# Patient Record
Sex: MALE | Race: ASIAN | NOT HISPANIC OR LATINO
[De-identification: names, ages, dates, MRNs, and addresses within clinical notes are randomized per-mention and may not be internally consistent; named-entity substitution may affect disease eponyms.]

---

## 2020-11-02 PROBLEM — Z00.00 ENCOUNTER FOR PREVENTIVE HEALTH EXAMINATION: Status: ACTIVE | Noted: 2020-11-02

## 2020-11-04 ENCOUNTER — APPOINTMENT (OUTPATIENT)
Dept: ORTHOPEDIC SURGERY | Facility: CLINIC | Age: 80
End: 2020-11-04
Payer: MEDICARE

## 2020-11-04 VITALS
DIASTOLIC BLOOD PRESSURE: 70 MMHG | SYSTOLIC BLOOD PRESSURE: 110 MMHG | HEIGHT: 67 IN | HEART RATE: 83 BPM | BODY MASS INDEX: 20.4 KG/M2 | TEMPERATURE: 97.9 F | WEIGHT: 130 LBS | OXYGEN SATURATION: 97 %

## 2020-11-04 DIAGNOSIS — G89.29 LOW BACK PAIN: ICD-10-CM

## 2020-11-04 DIAGNOSIS — K80.20 CALCULUS OF GALLBLADDER W/OUT CHOLECYSTITIS W/OUT OBSTRUCTION: ICD-10-CM

## 2020-11-04 DIAGNOSIS — Z72.3 LACK OF PHYSICAL EXERCISE: ICD-10-CM

## 2020-11-04 DIAGNOSIS — M54.5 LOW BACK PAIN: ICD-10-CM

## 2020-11-04 DIAGNOSIS — Z87.438 PERSONAL HISTORY OF OTHER DISEASES OF MALE GENITAL ORGANS: ICD-10-CM

## 2020-11-04 PROCEDURE — 99072 ADDL SUPL MATRL&STAF TM PHE: CPT

## 2020-11-04 PROCEDURE — 99204 OFFICE O/P NEW MOD 45 MIN: CPT

## 2020-11-04 RX ORDER — UBIDECARENONE/VIT E ACET 100MG-5
CAPSULE ORAL
Refills: 0 | Status: ACTIVE | COMMUNITY

## 2020-11-04 RX ORDER — ATORVASTATIN CALCIUM 80 MG/1
TABLET, FILM COATED ORAL
Refills: 0 | Status: ACTIVE | COMMUNITY

## 2020-11-04 RX ORDER — TAMSULOSIN HCL 0.4 MG
CAPSULE ORAL
Refills: 0 | Status: ACTIVE | COMMUNITY

## 2020-11-04 RX ORDER — MULTIVIT-MIN/IRON/FOLIC ACID/K 18-600-40
CAPSULE ORAL
Refills: 0 | Status: ACTIVE | COMMUNITY

## 2020-11-04 RX ORDER — ISOPROPYL ALCOHOL 700 MG/ML
LIQUID TOPICAL
Refills: 0 | Status: ACTIVE | COMMUNITY

## 2020-11-04 RX ORDER — FINASTERIDE 5 MG/1
TABLET, FILM COATED ORAL
Refills: 0 | Status: ACTIVE | COMMUNITY

## 2020-11-24 ENCOUNTER — APPOINTMENT (OUTPATIENT)
Dept: ORTHOPEDIC SURGERY | Facility: CLINIC | Age: 80
End: 2020-11-24
Payer: MEDICARE

## 2020-11-24 VITALS — TEMPERATURE: 97.1 F

## 2020-11-24 PROCEDURE — 99072 ADDL SUPL MATRL&STAF TM PHE: CPT

## 2020-11-24 PROCEDURE — 99204 OFFICE O/P NEW MOD 45 MIN: CPT

## 2020-12-01 ENCOUNTER — APPOINTMENT (OUTPATIENT)
Dept: ENDOCRINOLOGY | Facility: CLINIC | Age: 80
End: 2020-12-01
Payer: MEDICARE

## 2020-12-01 VITALS
HEART RATE: 99 BPM | WEIGHT: 132 LBS | SYSTOLIC BLOOD PRESSURE: 109 MMHG | BODY MASS INDEX: 20.67 KG/M2 | DIASTOLIC BLOOD PRESSURE: 72 MMHG

## 2020-12-01 PROCEDURE — 99205 OFFICE O/P NEW HI 60 MIN: CPT

## 2020-12-01 PROCEDURE — 99072 ADDL SUPL MATRL&STAF TM PHE: CPT

## 2020-12-02 LAB
25(OH)D3 SERPL-MCNC: 51.6 NG/ML
ALBUMIN SERPL ELPH-MCNC: 4.7 G/DL
ALP BLD-CCNC: 83 U/L
ALT SERPL-CCNC: 14 U/L
ANION GAP SERPL CALC-SCNC: 13 MMOL/L
AST SERPL-CCNC: 22 U/L
BILIRUB SERPL-MCNC: 0.3 MG/DL
BUN SERPL-MCNC: 26 MG/DL
CALCIUM SERPL-MCNC: 10.3 MG/DL
CALCIUM SERPL-MCNC: 10.3 MG/DL
CHLORIDE SERPL-SCNC: 104 MMOL/L
CO2 SERPL-SCNC: 24 MMOL/L
CREAT SERPL-MCNC: 0.99 MG/DL
GLUCOSE SERPL-MCNC: 110 MG/DL
MAGNESIUM SERPL-MCNC: 2.1 MG/DL
PARATHYROID HORMONE INTACT: 31 PG/ML
PHOSPHATE SERPL-MCNC: 3.7 MG/DL
POTASSIUM SERPL-SCNC: 4.1 MMOL/L
PROT SERPL-MCNC: 7.1 G/DL
SODIUM SERPL-SCNC: 142 MMOL/L

## 2020-12-04 LAB
ALBUMIN MFR SERPL ELPH: 59.3 %
ALBUMIN SERPL-MCNC: 4.2 G/DL
ALBUMIN/GLOB SERPL: 1.4 RATIO
ALPHA1 GLOB MFR SERPL ELPH: 3.8 %
ALPHA1 GLOB SERPL ELPH-MCNC: 0.3 G/DL
ALPHA2 GLOB MFR SERPL ELPH: 8.9 %
ALPHA2 GLOB SERPL ELPH-MCNC: 0.6 G/DL
B-GLOBULIN MFR SERPL ELPH: 12 %
B-GLOBULIN SERPL ELPH-MCNC: 0.9 G/DL
GAMMA GLOB FLD ELPH-MCNC: 1.1 G/DL
GAMMA GLOB MFR SERPL ELPH: 16 %
INTERPRETATION SERPL IEP-IMP: NORMAL
PROT SERPL-MCNC: 7.1 G/DL
PROT SERPL-MCNC: 7.1 G/DL

## 2020-12-07 LAB
ALBUPE: 27.1 %
ALP BONE SERPL-MCNC: 10.9 UG/L
ALPHA1UPE: 29.8 %
ALPHA2UPE: 18.3 %
BETAUPE: 12.7 %
GAMMAUPE: 12.1 %
IGA 24H UR QL IFE: NORMAL
KAPPA LC 24H UR QL: NORMAL
PROT PATTERN 24H UR ELPH-IMP: NORMAL
PROT UR-MCNC: 33 MG/DL
PROT UR-MCNC: 33 MG/DL

## 2021-01-04 NOTE — PHYSICAL EXAM
[de-identified] : Physical Exam:\par \par General: patient is well developed, well nourished, in no acute \par distress, alert and oriented x 3. \par \par Mood and affect: normal\par \par Respiratory: no respiratory distress noted\par \par Skin: no scars over spine, skin intact, no erythema, increased warmth\par \par Alignment:The spine is well compensated in the coronal and sagittal plane. There is no asymmetry on Manriquez Forward Bend Test.\par \par Gait: The patient is able to toe walk and heel walk without difficulty. The patient is able to tandem walk without difficulty.\par \par Palpation: no tenderness to palpation midline along spine or paraspinal region\par \par Range of motion: Cervical and Lumbar spine ROM is full\par \par Neurologic Exam:\par Motor: Manual Muscle testing in the upper and lower extremities is 5 out of 5 in all muscle groups. There is no evidence of muscular atrophy in the upper extremities. Sensory: Sensation to light touch is grossly intact in the upper and lower extremities\par \par Reflexes: DTR are present and symmetric throughout, negative lombardo bilaterally, negative inverted radial reflex bilaterally, no clonus, plantar responses are flexor\par \par Special tests: Spurlings sign absent. Lhermitte's sign is absent. Straight Leg Raise Negative, Cross Straight Leg Raise Negative, VICKI Test Negative\par \par Hip Exam: Full painless ROM of bilateral hips\par \par Vascular: Examination of the peripheral vascular system demonstrates no evidence of congestion or edema. no lymphedema bilateral lower extremities, pulses are present and symmetric in both lower extremities.\par \par \par  [de-identified] : DEXA 11/2020: severe osteoporosis\par \par XR full spine 11/2020 ([facility]): moderate/severe anterior wedge deformity of T10 vertebral body with focal exaggerated kyphosis; possible mild anterior wedge deformities of T11, L1 and L2 vertebral bodies; stable from prior imaging; mild thoracolumbar scoliosis\par \par MRI lumbar 11/2020 ([facility]): L4/L5 and L5/S1 disc bulges; T11/T12 disc bulge effacing ventral cord\par \par MRI thoracic 8/2020 ([facility]): bioconcave T10 vertebral body compression fracture, no retropulsion

## 2021-01-04 NOTE — REASON FOR VISIT
[Follow-Up Visit] : a follow-up visit for [Back Pain] : back pain [Other: ____] : [unfilled] [Family Member] : family member [Patient Declined  Services] : - None: Patient declined  services [FreeTextEntry3] : Patient's daughter present for exam and translating for patient

## 2021-01-04 NOTE — DISCUSSION/SUMMARY
[de-identified] : Discussed my findings with patient and daughter (who is translating for patient). DEXA scan shows severe osteoporosis. Discussed the need treatment to reduce risk of additional fractures, referral to endocrinology given. Recommending a TLSO brace due to severe osteoporosis, order given. No treatment indicated for fractures at this time. The patient will follow up with me in 2-3 months, sooner if there is an issue. XR full spine at that time. All questions answered.

## 2021-01-04 NOTE — HISTORY OF PRESENT ILLNESS
[de-identified] : Follow up 11/24/20: Patient presents for follow up. Continues to have back pain, unchanged. Denies new neurologic symptoms. Here to review imaging. \par \par Initial 11/4/20: Mr. MCCORMACK is a very pleasant 80 year old male who complains of mid and low back pain, onset 1/2020 after a fall. On initial presentation symptoms were as follows: Patient described the pain as intermittent.  Patient rated the pain as moderate.  The pain localized to mid and lower back.  There is no radiation of pain.  Patient  denies extremity numbness and paresthesias. \par \par The patient reports no loss of hand dexterity.\par The patient states there no loss of balance when walking.\par There no sensory loss in the arms or legs\par The patent no difficulty with urination.\par \par The patient no history of previous spine surgery.\par \par The patient has no history of unexpected weight loss, no history of active cancer, no history bladder or bowel dysfunction, no night pain, no fevers or chills.\par \par The past medical history, surgical history, family history, allergies, medications, 10+ point review of systems, family history and social history were reviewed and non contributory.\par \par

## 2021-02-16 NOTE — REASON FOR VISIT
[Initial Consultation] : an initial consultation for [Back Pain] : back pain [Family Member] : family member [Patient Declined  Services] : - None: Patient declined  services [FreeTextEntry3] : Patient's daughter present for exam and translating for patient.

## 2021-02-16 NOTE — PHYSICAL EXAM
[de-identified] : Physical Exam:\par \par General: patient is well developed, well nourished, in no acute \par distress, alert and oriented x 3. \par \par Mood and affect: normal\par \par Respiratory: no respiratory distress noted\par \par Skin: no scars over spine, skin intact, no erythema, increased warmth\par \par Alignment:The spine is well compensated in the coronal and sagittal plane. There is no asymmetry on Manriquez Forward Bend Test.\par \par Gait: The patient is able to toe walk and heel walk without difficulty. The patient is able to tandem walk without difficulty.\par \par Palpation: no tenderness to palpation midline along spine or paraspinal region\par \par Range of motion: Cervical and Lumbar spine ROM is full\par \par Neurologic Exam:\par Motor: Manual Muscle testing in the upper and lower extremities is 5 out of 5 in all muscle groups. There is no evidence of muscular atrophy in the upper extremities. Sensory: Sensation to light touch is grossly intact in the upper and lower extremities\par \par Reflexes: DTR are present and symmetric throughout, negative lombardo bilaterally, negative inverted radial reflex bilaterally, no clonus, plantar responses are flexor\par \par Special tests: Spurlings sign absent. Lhermitte's sign is absent. Straight Leg Raise Negative, Cross Straight Leg Raise Negative, VICKI Test Negative\par \par Hip Exam: Full painless ROM of bilateral hips\par \par Vascular: Examination of the peripheral vascular system demonstrates no evidence of congestion or edema. no lymphedema bilateral lower extremities, pulses are present and symmetric in both lower extremities.\par \par  [de-identified] : MRI thoracic 8/2020 ([facility]): bioconcave T10 vertebral body compression fracture, no retropulsion

## 2021-02-16 NOTE — DISCUSSION/SUMMARY
[de-identified] : Discussed the results of the patient's history, physical exam, and imaging with patient and daughter (daughter translating for patient).  I am recommending updated XR full spine, MRI lumbar without contrast, orders given. Will also order DEXA scan. The patient will follow up with me after imaging is completed, sooner if there is an issue.  All questions were answered.\par \par

## 2021-02-16 NOTE — HISTORY OF PRESENT ILLNESS
[de-identified] : Mr. MCCORMACK is a very pleasant 80 year old male who complains of mid and low back pain, onset 1/2020 after a fall. On initial presentation symptoms were as follows: Patient described the pain as intermittent. Patient rated the pain as moderate. The pain localized to mid and lower back. There is no radiation of pain. Patient denies extremity numbness and paresthesias. \par \par The patient reports no loss of hand dexterity.\par The patient states there no loss of balance when walking.\par There no sensory loss in the arms or legs\par The patent no difficulty with urination.\par \par The patient no history of previous spine surgery.\par \par The patient has no history of unexpected weight loss, no history of active cancer, no history bladder or bowel dysfunction, no night pain, no fevers or chills.\par \par The past medical history, surgical history, family history, allergies, medications, 10+ point review of systems, family history and social history were reviewed and non contributory.\par \par

## 2021-02-17 LAB
ALBUMIN SERPL ELPH-MCNC: 4.4 G/DL
CALCIUM SERPL-MCNC: 10 MG/DL

## 2021-02-17 NOTE — DATA REVIEWED
[FreeTextEntry1] : Laboratories (June 24, 2020) reviewed and significant for: \par Unremarkable complete blood count\par Calcium 9.3 mg/dL (albumin 4.5 g/dL)\par BUN/creatinine 21/1.08 mg/dL (eGFR 65 mL/min)\par Alkaline phosphatase 81 U/L\par TSH 2.100 uIU/mL\par \par Most recent bone mineral density\par Date: November 24, 2020\par Source: [place]\par Site: [facility] [facility]\par \par Site	BMD (g/cm2)	T-score	Change previous	Change baseline	\par Lumbar spine	0.611	-5.1\par Femoral neck	0.646	-3.3	\par Total hip	                0.735       -2.5         \par Distal radius           	0.579       -2.7          \par DXA Comments:

## 2021-02-17 NOTE — ADDENDUM
[FreeTextEntry1] : Recent laboratory results as below, overall unremarkable for a secondary cause of bone loss; discussed with Kathy per his preference. Random urine protein electrophoresis with borderline elevated protein but no monoclonal proteins. Blood urea nitrogen elevated and recommended hydration. Calciotropic panel otherwise unremarkable. Glucose not fasting. Her father has not made a clear decision regarding teriparatide versus denosumab, however, they want to pursue prior authorization for teriparatide to determine cost. 12/07/20\par \par Teriparatide has been approved; discussed with Kathy. They are interested in teriparatide therapy. Her  can administered subcutaneous injections. We discussed measuring serum calcium one month after starting treatment. 12/08/20\par \par Serum calcium within range on teriparatide; discussed with Kathy. We will repeat blood tests before his next appointment. 2/17/21

## 2021-02-17 NOTE — PHYSICAL EXAM
[Alert] : alert [Healthy Appearance] : healthy appearance [No Acute Distress] : no acute distress [Normal Sclera/Conjunctiva] : normal sclera/conjunctiva [No Neck Mass] : no neck mass was observed [No LAD] : no lymphadenopathy [Supple] : the neck was supple [Thyroid Not Enlarged] : the thyroid was not enlarged [No Respiratory Distress] : no respiratory distress [Clear to Auscultation] : lungs were clear to auscultation bilaterally [Normal S1, S2] : normal S1 and S2 [Normal Rate] : heart rate was normal [Regular Rhythm] : with a regular rhythm [No Spinal Tenderness] : no spinal tenderness [Kyphosis] : kyphosis present [No Stigmata of Cushings Syndrome] : no stigmata of Cushings Syndrome [Normal Gait] : normal gait [Normal Insight/Judgement] : insight and judgment were intact [Scoliosis] : no scoliosis [Acanthosis Nigricans] : no acanthosis nigricans [de-identified] : no moon facies, no supraclavicular fat pads [de-identified] : + difficulty balancing on one leg bilaterally

## 2021-02-17 NOTE — HISTORY OF PRESENT ILLNESS
[FreeTextEntry1] : Mr. Echols is an 80 year-old man with a history of benign prostatic hyperplasia and tobacco use until February 2020 presenting to establish care with me for osteoporosis. He was accompanied by his daughter, Kathy, who acted as an . His son-in-law is a physician involved in his care. \par \par Bone History\par Osteoporosis diagnosed in February 2020 by low trauma fracture; most recent bone density as below\par Fracture history: T10 compression fracture in February 2020 in a fall at home; right-sided rib fractures over 20 years ago\par Family history: Mother with history of kyphosis but no formal diagnosis of osteoporosis\par Treatment: None\par \par Falls: No\par Height loss: About 2 inches\par Kidney stones: No\par Dental health: Regular appointments, history of implants, no upcoming procedures planned\par Exercise: Limited since fracture\par Dairy intake: 0-1 serving daily (glass of milk a few times a week)\par Calcium supplements: None\par Multivitamin: None\par Vitamin D supplements: 1000 intl units daily\par \par Osteoporosis risk factors include: Postmenopausal status,  race, prior fracture, falls, height loss, small thin bones, tobacco use, excessive alcohol, anorexia, family history, vitamin D deficiency, corticosteroid use, seizure medications, malabsorption, hyperparathyroidism, hyperthyroidism.\par NEGATIVE EXCEPT: Prior fracture, tobacco use

## 2021-04-13 ENCOUNTER — APPOINTMENT (OUTPATIENT)
Dept: ORTHOPEDIC SURGERY | Facility: CLINIC | Age: 81
End: 2021-04-13
Payer: MEDICARE

## 2021-04-13 ENCOUNTER — OUTPATIENT (OUTPATIENT)
Dept: OUTPATIENT SERVICES | Facility: HOSPITAL | Age: 81
LOS: 1 days | End: 2021-04-13
Payer: MEDICARE

## 2021-04-13 ENCOUNTER — RESULT REVIEW (OUTPATIENT)
Age: 81
End: 2021-04-13

## 2021-04-13 VITALS — TEMPERATURE: 97.9 F

## 2021-04-13 PROCEDURE — 99214 OFFICE O/P EST MOD 30 MIN: CPT

## 2021-04-13 PROCEDURE — 99072 ADDL SUPL MATRL&STAF TM PHE: CPT

## 2021-04-13 PROCEDURE — 72082 X-RAY EXAM ENTIRE SPI 2/3 VW: CPT

## 2021-04-13 PROCEDURE — 72082 X-RAY EXAM ENTIRE SPI 2/3 VW: CPT | Mod: 26

## 2021-04-13 RX ORDER — VITAMIN A 10000 UNIT
TABLET ORAL
Refills: 0 | Status: DISCONTINUED | COMMUNITY
End: 2021-04-13

## 2021-04-14 NOTE — HISTORY OF PRESENT ILLNESS
[de-identified] : Follow up 4/13/21: Patient presents for follow up. Reports worsening mid and upper thoracic pain over the past several months. Was able to walk about 20 minutes before stopping, now only able to walk 5-10 minutes. Under the care of Dr. Sands for his osteoporosis, has been on forteo for 3 months. Denies new neurologic symptoms.\par \par Follow up 11/24/20: Patient presents for follow up. Continues to have back pain, unchanged. Denies new neurologic symptoms. Here to review imaging. \par \par Initial 11/4/20: Mr. MCCORMACK is a very pleasant 80 year old male who complains of mid and low back pain, onset 1/2020 after a fall. On initial presentation symptoms were as follows: Patient described the pain as intermittent. Patient rated the pain as moderate. The pain localized to mid and lower back. There is no radiation of pain. Patient denies extremity numbness and paresthesias. \par \par The patient reports no loss of hand dexterity.\par The patient states there no loss of balance when walking.\par There no sensory loss in the arms or legs\par The patent no difficulty with urination.\par \par The patient no history of previous spine surgery.\par \par The patient has no history of unexpected weight loss, no history of active cancer, no history bladder or bowel dysfunction, no night pain, no fevers or chills.\par \par The past medical history, surgical history, family history, allergies, medications, 10+ point review of systems, family history and social history were reviewed and non contributory.\par

## 2021-04-14 NOTE — PHYSICAL EXAM
[de-identified] : Physical Exam:\par \par General: patient is well developed, well nourished, in no acute \par distress, alert and oriented x 3. \par \par Mood and affect: normal\par \par Respiratory: no respiratory distress noted\par \par Skin: no scars over spine, skin intact, no erythema, increased warmth\par \par Alignment:The spine is well compensated in the coronal and sagittal plane. \par \par Gait: The patient is able to toe walk and heel walk without difficulty.\par \par Palpation: no tenderness to palpation midline along spine or paraspinal region\par \par Range of motion: Cervical and Lumbar spine ROM is full\par \par Neurologic Exam:\par Motor: Manual Muscle testing in the upper and lower extremities is 5 out of 5 in all muscle groups. There is no evidence of muscular atrophy in the upper extremities. Sensory: Sensation to light touch is grossly intact in the upper and lower extremities\par \par Reflexes: DTR are present and symmetric throughout\par \par  [de-identified] : XR 4/13/21: T10 compression fracture, unchanged\par \par MRI thoracic 8/2020 ([facility]): bioconcave T10 vertebral body compression fracture, no retropulsion

## 2021-04-14 NOTE — REASON FOR VISIT
[Follow-Up Visit] : a follow-up visit for [Back Pain] : back pain [Other: ____] : [unfilled] [Other: _____] : [unfilled] [Patient Declined  Services] : - None: Patient declined  services [FreeTextEntry3] : daughter present for exam and translating for patient

## 2021-04-14 NOTE — DISCUSSION/SUMMARY
[de-identified] : Discussed the results of the patient's history, physical exam, and imaging with patient and daughter (daughter translating for patient). Patient reports worsening mid back pain. I am recommending an updated MRI thoracic without contrast, order given. The patient will follow up with me after imaging is completed, sooner if there is an issue.  All questions were answered.

## 2021-04-27 ENCOUNTER — APPOINTMENT (OUTPATIENT)
Dept: ORTHOPEDIC SURGERY | Facility: CLINIC | Age: 81
End: 2021-04-27
Payer: MEDICARE

## 2021-04-27 VITALS — TEMPERATURE: 97 F

## 2021-04-27 DIAGNOSIS — M41.80 OTHER FORMS OF SCOLIOSIS, SITE UNSPECIFIED: ICD-10-CM

## 2021-04-27 PROCEDURE — 99072 ADDL SUPL MATRL&STAF TM PHE: CPT

## 2021-04-27 PROCEDURE — 99214 OFFICE O/P EST MOD 30 MIN: CPT

## 2021-05-04 NOTE — PHYSICAL EXAM
[de-identified] : Physical Exam:\par \par General: patient is well developed, well nourished, in no acute \par distress, alert and oriented x 3. \par \par Mood and affect: normal\par \par Respiratory: no respiratory distress noted\par \par Skin: no scars over spine, skin intact, no erythema, increased warmth\par \par Alignment:The spine is well compensated in the coronal and sagittal plane. \par \par Gait: The patient is able to toe walk and heel walk without difficulty.\par \par Palpation: no tenderness to palpation midline along spine or paraspinal region\par \par Range of motion: Cervical and Lumbar spine ROM is full\par \par Neurologic Exam:\par Motor: Manual Muscle testing in the upper and lower extremities is 5 out of 5 in all muscle groups. There is no evidence of muscular atrophy in the upper extremities. Sensory: Sensation to light touch is grossly intact in the upper and lower extremities\par \par Reflexes: DTR are present and symmetric throughout\par \par  [de-identified] : MRI thoracic 4/16/21: T10 severe anterior wedge compression deformity, unchanged from 8/2020 MRI, interval resolution of marrow edema, minimal retropulsion without stenosis; T11/T12 disc herniation with moderate central canal stenosis, no cord compression, no cord signal change\par \par XR 4/13/21: T10 compression fracture, unchanged\par \par MRI thoracic 8/2020 ([facility]): bioconcave T10 vertebral body compression fracture, no retropulsion

## 2021-05-04 NOTE — REASON FOR VISIT
[Follow-Up Visit] : a follow-up visit for [Back Pain] : back pain [Other: ____] : [unfilled] [Ad Hoc ] : provided by an ad hoc  [FreeTextEntry4] : daughter

## 2021-05-04 NOTE — HISTORY OF PRESENT ILLNESS
[de-identified] : Follow up 4/27/21: Patient presents for follow up. Continues to have mid and upper thoracic pain, unchanged from last visit. Denies new neurologic symptoms. Here to review imaging. \par \par Follow up 4/13/21: Patient presents for follow up. Reports worsening mid and upper thoracic pain over the past several months. Was able to walk about 20 minutes before stopping, now only able to walk 5-10 minutes. Under the care of Dr. Sands for his osteoporosis, has been on forteo for 3 months. Denies new neurologic symptoms.\par \par Follow up 11/24/20: Patient presents for follow up. Continues to have back pain, unchanged. Denies new neurologic symptoms. Here to review imaging. \par \par Initial 11/4/20: Mr. MCCORMACK is a very pleasant 80 year old male who complains of mid and low back pain, onset 1/2020 after a fall. On initial presentation symptoms were as follows: Patient described the pain as intermittent. Patient rated the pain as moderate. The pain localized to mid and lower back. There is no radiation of pain. Patient denies extremity numbness and paresthesias. \par \par The patient reports no loss of hand dexterity.\par The patient states there no loss of balance when walking.\par There no sensory loss in the arms or legs\par The patent no difficulty with urination.\par \par The patient no history of previous spine surgery.\par \par The patient has no history of unexpected weight loss, no history of active cancer, no history bladder or bowel dysfunction, no night pain, no fevers or chills.\par \par The past medical history, surgical history, family history, allergies, medications, 10+ point review of systems, family history and social history were reviewed and non contributory.\par

## 2021-06-01 ENCOUNTER — APPOINTMENT (OUTPATIENT)
Dept: ENDOCRINOLOGY | Facility: CLINIC | Age: 81
End: 2021-06-01
Payer: MEDICARE

## 2021-06-01 VITALS
HEART RATE: 89 BPM | DIASTOLIC BLOOD PRESSURE: 82 MMHG | BODY MASS INDEX: 20.05 KG/M2 | WEIGHT: 128 LBS | SYSTOLIC BLOOD PRESSURE: 117 MMHG

## 2021-06-01 PROCEDURE — 99214 OFFICE O/P EST MOD 30 MIN: CPT

## 2021-06-01 RX ORDER — ZOLPIDEM TARTRATE 10 MG/1
10 TABLET ORAL
Qty: 30 | Refills: 0 | Status: ACTIVE | COMMUNITY
Start: 2021-01-04

## 2021-06-02 LAB
25(OH)D3 SERPL-MCNC: 47.6 NG/ML
ALBUMIN SERPL ELPH-MCNC: 4.5 G/DL
ALP BLD-CCNC: 69 U/L
ALT SERPL-CCNC: 17 U/L
ANION GAP SERPL CALC-SCNC: 11 MMOL/L
AST SERPL-CCNC: 22 U/L
BILIRUB SERPL-MCNC: 0.4 MG/DL
BUN SERPL-MCNC: 28 MG/DL
CALCIUM SERPL-MCNC: 10.2 MG/DL
CALCIUM SERPL-MCNC: 10.2 MG/DL
CHLORIDE SERPL-SCNC: 103 MMOL/L
CO2 SERPL-SCNC: 26 MMOL/L
CREAT SERPL-MCNC: 1.14 MG/DL
GLUCOSE SERPL-MCNC: 86 MG/DL
MAGNESIUM SERPL-MCNC: 1.9 MG/DL
PARATHYROID HORMONE INTACT: 17 PG/ML
PHOSPHATE SERPL-MCNC: 3.6 MG/DL
POTASSIUM SERPL-SCNC: 4.5 MMOL/L
PROT SERPL-MCNC: 7.1 G/DL
SODIUM SERPL-SCNC: 141 MMOL/L

## 2021-06-07 LAB
ALP BONE SERPL-MCNC: 7.7 UG/L
OSTEOCALCIN SERPL-MCNC: 23.1 NG/ML

## 2021-06-07 NOTE — HISTORY OF PRESENT ILLNESS
[FreeTextEntry1] : Mr. Echols is an 80 year-old man with a history of benign prostatic hyperplasia and tobacco use until February 2020 presenting for follow-up of osteoporosis. I saw him for an initial visit in December 2020. He was accompanied by his daughter, Kathy, who acted as an . His son-in-law is a physician involved in his care. \par \par Bone History\par Osteoporosis diagnosed in February 2020 by low trauma fracture; most recent bone density as below\par Fracture history: T10 compression fracture in February 2020 in a fall at home; right-sided rib fractures over 20 years ago\par Family history: Mother with history of kyphosis but no formal diagnosis of osteoporosis\par Treatment: Teriparatide 20 mcg SC daily from December 2020 to present\par \par Falls: No\par Height loss: About 2 inches\par Kidney stones: No\par Dental health: Regular appointments, history of implants, no upcoming procedures planned\par Exercise: Limited since fracture\par Dairy intake: 0-1 serving daily (glass of milk a few times a week)\par Calcium supplements: None\par Multivitamin: None\par Vitamin D supplements: 1000 intl units daily\par \par Osteoporosis risk factors include: Postmenopausal status,  race, prior fracture, falls, height loss, small thin bones, tobacco use, excessive alcohol, anorexia, family history, vitamin D deficiency, corticosteroid use, seizure medications, malabsorption, hyperparathyroidism, hyperthyroidism.\par NEGATIVE EXCEPT: Prior fracture, tobacco use\par \par Interim History \par Laboratory results from his initial visit as below, overall unremarkable for a secondary cause of bone loss.\par We started teriparatide therapy in December 2020 and he is tolerated well. Serum calcium within range on teriparatide.\par He has had continued back pain. He has completed a course of physical therapy. He has followed with Dr. Fan Reagan. Imaging demonstrated healing of the T10 fracture. He was referred to pain management. \par Medical and surgical history, medications, allergies, social and family history reviewed and updated as needed.

## 2021-06-07 NOTE — ADDENDUM
[FreeTextEntry1] : Recent laboratory results as below; discussed with Kathy. Serum calcium within range. Blood urea nitrogen elevated and encouraged hydration. Osteocalcin at the upper end of normal; bone-specific alkaline phosphatase overall stable. Plan as above. 6/07/21

## 2021-06-07 NOTE — DATA REVIEWED
[FreeTextEntry1] : Laboratories (June 24, 2020) reviewed and significant for: \par Unremarkable complete blood count\par Calcium 9.3 mg/dL (albumin 4.5 g/dL)\par BUN/creatinine 21/1.08 mg/dL (eGFR 65 mL/min)\par Alkaline phosphatase 81 U/L\par TSH 2.100 uIU/mL\par \par Most recent bone mineral density\par Date: November 24, 2020\par Source: [employer]\par Site: [facility] [employer] [facility]\par \par Site	BMD (g/cm2)	T-score	Change previous	Change baseline	\par Lumbar spine	0.611	-5.1\par Femoral neck	0.646	-3.3	\par Total hip	                0.735       -2.5         \par Distal radius           	0.579       -2.7          \par DXA Comments:

## 2021-06-07 NOTE — PHYSICAL EXAM
[Alert] : alert [Healthy Appearance] : healthy appearance [No Acute Distress] : no acute distress [Normal Sclera/Conjunctiva] : normal sclera/conjunctiva [No Respiratory Distress] : no respiratory distress [No Spinal Tenderness] : no spinal tenderness [Kyphosis] : kyphosis present [No Stigmata of Cushings Syndrome] : no stigmata of Cushings Syndrome [Normal Gait] : normal gait [Normal Insight/Judgement] : insight and judgment were intact [Normal Hearing] : hearing was normal [Scoliosis] : no scoliosis [Acanthosis Nigricans] : no acanthosis nigricans [de-identified] : no moon facies, no supraclavicular fat pads

## 2021-06-07 NOTE — ASSESSMENT
[FreeTextEntry1] : Osteoporosis. He has a history of fragility fracture. He has been on teriparatide therapy from 2020 to present. Metabolic evaluation for secondary causes of bone loss previously unremarkable. We have discussed the risks and benefits of osteoanabolic therapy, including but not limited to hypercalcemia, nausea, orthostatic hypotension, osteosarcoma. He is tolerating teriparatide and we will continue. We have discussed the need for antiresorptive therapy after a course of osteoanabolic therapy so he does not lose the bone that was gained with treatment. \par Continue teriparatide 20 mcg SC daily with plan to complete a 24 month course in 2022\par Calcium 1200 mg daily from diet and supplements (to be taken in divided doses as no more than 500-600 mg can be absorbed at one time); advised supplemental calcium\par Continue current vitamin D regimen pending level\par Diet, exercise and fall prevention discussed\par \par Next bone density testin months\par Next appointment: 6 months or earlier as needed\par \par I reviewed the DXA performed on 2020 with the patient today.\par I reviewed the laboratories performed on 2021 with the patient today. \par I counseled the patient regarding calcium and vitamin D intake today.\par I discussed the following osteoporosis therapies: Teriparatide \par \par CC:\par Dr. Greg Bernard, Fax [phone]

## 2021-08-18 ENCOUNTER — APPOINTMENT (OUTPATIENT)
Dept: ORTHOPEDIC SURGERY | Facility: CLINIC | Age: 81
End: 2021-08-18

## 2021-11-22 ENCOUNTER — RX RENEWAL (OUTPATIENT)
Age: 81
End: 2021-11-22

## 2021-12-01 ENCOUNTER — APPOINTMENT (OUTPATIENT)
Dept: ENDOCRINOLOGY | Facility: CLINIC | Age: 81
End: 2021-12-01
Payer: MEDICARE

## 2021-12-01 VITALS
WEIGHT: 127 LBS | SYSTOLIC BLOOD PRESSURE: 110 MMHG | HEART RATE: 82 BPM | HEIGHT: 67 IN | BODY MASS INDEX: 19.93 KG/M2 | DIASTOLIC BLOOD PRESSURE: 75 MMHG

## 2021-12-01 PROCEDURE — 99214 OFFICE O/P EST MOD 30 MIN: CPT

## 2021-12-02 LAB
25(OH)D3 SERPL-MCNC: 45.1 NG/ML
ALBUMIN SERPL ELPH-MCNC: 4.5 G/DL
ALP BLD-CCNC: 72 U/L
ALT SERPL-CCNC: 12 U/L
ANION GAP SERPL CALC-SCNC: 16 MMOL/L
AST SERPL-CCNC: 22 U/L
BILIRUB SERPL-MCNC: 0.3 MG/DL
BUN SERPL-MCNC: 27 MG/DL
CALCIUM SERPL-MCNC: 10.3 MG/DL
CHLORIDE SERPL-SCNC: 106 MMOL/L
CO2 SERPL-SCNC: 22 MMOL/L
CREAT SERPL-MCNC: 1.15 MG/DL
GLUCOSE SERPL-MCNC: 109 MG/DL
MAGNESIUM SERPL-MCNC: 1.9 MG/DL
PHOSPHATE SERPL-MCNC: 2.6 MG/DL
POTASSIUM SERPL-SCNC: 4.1 MMOL/L
PROT SERPL-MCNC: 6.6 G/DL
SODIUM SERPL-SCNC: 144 MMOL/L

## 2021-12-06 LAB — OSTEOCALCIN SERPL-MCNC: 16.5 NG/ML

## 2021-12-07 LAB — ALP BONE SERPL-MCNC: 7.8 UG/L

## 2021-12-07 NOTE — ASSESSMENT
[FreeTextEntry1] : Osteoporosis. He has a history of fragility fracture. He has been on teriparatide therapy from December 2020 to present. Metabolic evaluation for secondary causes of bone loss previously unremarkable. We have discussed the risks and benefits of osteoanabolic therapy, including but not limited to hypercalcemia, nausea, orthostatic hypotension, osteosarcoma. He is tolerating teriparatide and we will continue. We have discussed the need for antiresorptive therapy after a course of osteoanabolic therapy so he does not lose the bone that was gained with treatment. \par Interval bone density testing and calciotropic panel\par Continue teriparatide 20 mcg SC daily with plan to complete a 24 month course in December 2022\par Calcium 1200 mg daily from diet and supplements (to be taken in divided doses as no more than 500-600 mg can be absorbed at one time); continue current regimen\par Continue current vitamin D regimen pending level\par Diet, exercise and fall prevention discussed\par \par Return to see me in 6 months.\par \par I reviewed the DXA performed on November 24, 2020 with the patient today.\par I reviewed the laboratories performed from 2020 to present with the patient today. \par I counseled the patient regarding calcium and vitamin D intake today.\par I discussed the following osteoporosis therapies: Teriparatide \par \par CC:\par Dr. Greg Bernard, Fax [phone]

## 2021-12-07 NOTE — ADDENDUM
[FreeTextEntry1] : Recent laboratory results as below. Blood urea nitrogen and creatinine elevated and recommend hydration. Calciotropic panel otherwise within range. Bone turnover markers pending. I left a telephone message for Kathy to discuss. 12/02/21\par \par Bone-specific alkaline phosphatase and osteocalcin starting to trend down as expected. 12/07/21

## 2021-12-07 NOTE — PHYSICAL EXAM
[Alert] : alert [Healthy Appearance] : healthy appearance [No Acute Distress] : no acute distress [Normal Sclera/Conjunctiva] : normal sclera/conjunctiva [Normal Hearing] : hearing was normal [No Respiratory Distress] : no respiratory distress [No Spinal Tenderness] : no spinal tenderness [Kyphosis] : kyphosis present [No Stigmata of Cushings Syndrome] : no stigmata of Cushings Syndrome [Normal Gait] : normal gait [Normal Insight/Judgement] : insight and judgment were intact [Scoliosis] : no scoliosis [Acanthosis Nigricans] : no acanthosis nigricans [de-identified] : no moon facies, no supraclavicular fat pads

## 2021-12-07 NOTE — HISTORY OF PRESENT ILLNESS
[FreeTextEntry1] : Mr. Echols is an 81 year-old man with a history of benign prostatic hyperplasia and tobacco use until February 2020 presenting for follow-up of osteoporosis. I saw him for an initial visit in December 2020 and last in June 2021. He was accompanied by his daughter, Kathy, who acted as an . His son-in-law is a physician involved in his care. \par \par Bone History\par Osteoporosis diagnosed in February 2020 by low trauma fracture; most recent bone density as below\par Fracture history: T10 compression fracture in February 2020 in a fall at home; right-sided rib fractures over 20 years ago\par Family history: Mother with history of kyphosis but no formal diagnosis of osteoporosis\par Treatment: Teriparatide 20 mcg SC daily from December 2020 to present\par \par Falls: No\par Height loss: About 2 inches\par Kidney stones: No\par Dental health: Regular appointments, history of implants, no upcoming procedures planned\par Exercise: Limited since fracture\par Dairy intake: 0-1 serving daily (glass of milk a few times a week)\par Calcium supplements: 600 mg twice daily\par Multivitamin: None\par Vitamin D supplements: 400 intl units twice a week + in calcium supplement\par \par Osteoporosis risk factors include: Postmenopausal status,  race, prior fracture, falls, height loss, small thin bones, tobacco use, excessive alcohol, anorexia, family history, vitamin D deficiency, corticosteroid use, seizure medications, malabsorption, hyperparathyroidism, hyperthyroidism.\par NEGATIVE EXCEPT: Prior fracture, tobacco use\par \par Interim History \par We started teriparatide therapy in December 2020 and he is overall tolerating well. \par Laboratory results from last visit as below. Serum calcium within range. Blood urea nitrogen elevated and encouraged hydration. Osteocalcin at the upper end of normal; bone-specific alkaline phosphatase overall stable. \par He started calcium supplementation. \par He has had injections for back pain. \par Medical and surgical history, medications, allergies, social and family history reviewed and updated as needed.

## 2021-12-07 NOTE — DATA REVIEWED
[FreeTextEntry1] : Most recent bone mineral density\par Date: November 24, 2020\par Source: Lunar\par Site: [facility]\par \par Site	BMD (g/cm2)	T-score	Change previous	Change baseline	\par Lumbar spine	0.611	-5.1\par Femoral neck	0.646	-3.3	\par Total hip	                0.735       -2.5         \par Distal radius           	0.579       -2.7          \par DXA Comments:

## 2022-05-23 ENCOUNTER — RX RENEWAL (OUTPATIENT)
Age: 82
End: 2022-05-23

## 2022-11-28 ENCOUNTER — RX RENEWAL (OUTPATIENT)
Age: 82
End: 2022-11-28

## 2022-12-13 ENCOUNTER — TRANSCRIPTION ENCOUNTER (OUTPATIENT)
Age: 82
End: 2022-12-13

## 2022-12-13 ENCOUNTER — APPOINTMENT (OUTPATIENT)
Dept: ENDOCRINOLOGY | Facility: CLINIC | Age: 82
End: 2022-12-13

## 2022-12-13 VITALS
WEIGHT: 129 LBS | HEART RATE: 69 BPM | BODY MASS INDEX: 20.2 KG/M2 | SYSTOLIC BLOOD PRESSURE: 146 MMHG | DIASTOLIC BLOOD PRESSURE: 89 MMHG

## 2022-12-13 PROCEDURE — 99215 OFFICE O/P EST HI 40 MIN: CPT | Mod: 25

## 2022-12-13 NOTE — HISTORY OF PRESENT ILLNESS
[FreeTextEntry1] : Mr. MCCORMACK is a 82 year male with pmhx of osteoporosis, BPH, h/o tobacco use (stopped 2/2020) who presents for follow-up.\par \par Presents with daughter, Kathy\par \par Patient of Dr. Sands, last visit, 12/21/2021\par \par Interval change:\par Endorses has 3 more doses of Teriparatide until completion of 2 year course\par No fractures in past year since last visit\par No falls since last visit\par Continue calcium supplement 1200 mg daily with 1000 IU daily\par Feeling well\par \par \par 1. Osteoporosis\par Diagnosed in 2/2020 by low trauma fracture\par Fracture history: T10 compression fracture in 2/2020 in a fall at home, right-sided rib fractures >20 years ago\par No parental hip fracture history\par Mother with kyphosis but no formal osteoporosis dx\par \par Current treatment: Teriparatide 20mcg daily (started 12/2020)\par Past treatment: none

## 2022-12-13 NOTE — RESULTS/DATA
[L1 - L4] : L1 - L4 [T-Score ___] : T-score: [unfilled] [FreeTextEntry2] : 02/07/2022 [de-identified] : Left [de-identified] : Left [FreeTextEntry4] : 11/24/2020

## 2022-12-13 NOTE — ASSESSMENT
[FreeTextEntry1] : 1. Osteoporosis\par Dx in 2/2020 in setting of low trauma fracture\par Past evaluation for secondary causes of bone loss unremarkable\par Current treatment: Teriparatide 20mcg daily subcutaneously\par Has 3 more injections until he completes his 2 year course of teriparatide.  Presents today to discuss antiresorptive regimen to initiate following completion.\par Most recent DXA reveals Tscore at LS -4.2 (+21% change), Left total hip Tscore -2.1 (+7.8% change) and Right total hip -1.9 (+9.3% change).\par Discussed antiresorptive regimens of: oral vs IV bisphosphonate, as well as prolia, includive of administration, possibe SEs, need for prolia dosing Q6 months, potential drug holiday with bisphosphonates and continued monitoring with DXA\par Given degree of osteoporosis from DXA earlier this year, recommend prolia to transition from his current teriparatide regimen to lock in osteoanabolic effect, which patient and patients daughter are amendable to\par -- labs today\par -- start auth process for Prolia, wishes to return beginning of January to initiate this regimen, when back [place] the country\par \par Per patient and patients daughters request, I will email lab results, as [place]\par Follow-up in January to initiate prolia, pending insurance auth\par \par Rosalind Whitaker MS, FNP-BC, CDCES\par 12/13/2022

## 2022-12-13 NOTE — PHYSICAL EXAM
[Alert] : alert [Healthy Appearance] : healthy appearance [No Acute Distress] : no acute distress [Normal Sclera/Conjunctiva] : normal sclera/conjunctiva [Normal Hearing] : hearing was normal [No Respiratory Distress] : no respiratory distress [Normal Rate and Effort] : normal respiratory rate and effort [Clear to Auscultation] : lungs were clear to auscultation bilaterally [Normal S1, S2] : normal S1 and S2 [Normal Rate] : heart rate was normal [Regular Rhythm] : with a regular rhythm [Normal Gait] : normal gait [Oriented x3] : oriented to person, place, and time [Normal Affect] : the affect was normal [Normal Insight/Judgement] : insight and judgment were intact [Normal Mood] : the mood was normal

## 2022-12-20 LAB
25(OH)D3 SERPL-MCNC: 44.8 NG/ML
ALBUMIN SERPL ELPH-MCNC: 4.3 G/DL
ALP BLD-CCNC: 63 U/L
ALP BONE SERPL-MCNC: 8.8 UG/L
ALT SERPL-CCNC: 12 U/L
ANION GAP SERPL CALC-SCNC: 13 MMOL/L
AST SERPL-CCNC: 21 U/L
BILIRUB SERPL-MCNC: 0.3 MG/DL
BUN SERPL-MCNC: 32 MG/DL
CALCIUM SERPL-MCNC: 10.5 MG/DL
CHLORIDE SERPL-SCNC: 106 MMOL/L
CO2 SERPL-SCNC: 23 MMOL/L
CREAT SERPL-MCNC: 1.12 MG/DL
EGFR: 66 ML/MIN/1.73M2
GLUCOSE SERPL-MCNC: 94 MG/DL
MAGNESIUM SERPL-MCNC: 1.7 MG/DL
OSTEOCALCIN SERPL-MCNC: 18 NG/ML
PHOSPHATE SERPL-MCNC: 3 MG/DL
POTASSIUM SERPL-SCNC: 4.1 MMOL/L
PROT SERPL-MCNC: 6.6 G/DL
SODIUM SERPL-SCNC: 142 MMOL/L

## 2023-01-10 ENCOUNTER — NON-APPOINTMENT (OUTPATIENT)
Age: 83
End: 2023-01-10

## 2023-01-11 ENCOUNTER — NON-APPOINTMENT (OUTPATIENT)
Age: 83
End: 2023-01-11

## 2023-01-12 ENCOUNTER — APPOINTMENT (OUTPATIENT)
Dept: ENDOCRINOLOGY | Facility: CLINIC | Age: 83
End: 2023-01-12

## 2023-03-02 ENCOUNTER — APPOINTMENT (OUTPATIENT)
Dept: ENDOCRINOLOGY | Facility: CLINIC | Age: 83
End: 2023-03-02
Payer: MEDICARE

## 2023-03-02 VITALS
HEART RATE: 72 BPM | SYSTOLIC BLOOD PRESSURE: 133 MMHG | DIASTOLIC BLOOD PRESSURE: 78 MMHG | WEIGHT: 124 LBS | BODY MASS INDEX: 19.42 KG/M2

## 2023-03-02 PROCEDURE — 96372 THER/PROPH/DIAG INJ SC/IM: CPT

## 2023-03-02 PROCEDURE — 99214 OFFICE O/P EST MOD 30 MIN: CPT | Mod: 25

## 2023-03-02 RX ORDER — TERIPARATIDE 250 UG/ML
620 INJECTION, SOLUTION SUBCUTANEOUS
Qty: 3 | Refills: 1 | Status: DISCONTINUED | COMMUNITY
Start: 2021-11-22 | End: 2023-03-02

## 2023-03-02 RX ORDER — DENOSUMAB 60 MG/ML
60 INJECTION SUBCUTANEOUS
Qty: 1 | Refills: 0 | Status: COMPLETED | OUTPATIENT
Start: 2023-03-02

## 2023-03-02 RX ORDER — PEN NEEDLE, DIABETIC 29 G X1/2"
31G X 5 MM NEEDLE, DISPOSABLE MISCELLANEOUS
Qty: 1 | Refills: 3 | Status: DISCONTINUED | COMMUNITY
Start: 2020-12-08 | End: 2023-03-02

## 2023-03-02 RX ORDER — TERIPARATIDE 250 UG/ML
600 INJECTION, SOLUTION SUBCUTANEOUS DAILY
Qty: 3 | Refills: 0 | Status: DISCONTINUED | COMMUNITY
Start: 2020-12-08 | End: 2023-03-02

## 2023-03-02 RX ADMIN — DENOSUMAB 60 MG/ML: 60 INJECTION SUBCUTANEOUS at 00:00

## 2023-03-02 NOTE — ADDENDUM
[FreeTextEntry1] : Procedure Note: Denosumab 60 mg SC administered into left deltoid area. Zoya Sands MD

## 2023-03-02 NOTE — HISTORY OF PRESENT ILLNESS
[FreeTextEntry1] : Mr. Echols is an 82 year-old man with a history of benign prostatic hyperplasia and tobacco use until February 2020 presenting for follow-up of osteoporosis. I saw him for an initial visit in December 2020 and last in December 2021; he saw Rosalind Whitaker NP in December 2022. He was accompanied by his daughter, Kathy, who acted as an . His son-in-law is a physician involved in his care. \par \par Bone History\par Osteoporosis diagnosed in February 2020 by low trauma fracture; most recent bone density as below\par Fracture history: T10 compression fracture in February 2020 in a fall at home; right-sided rib fractures over 20 years ago\par Family history: Mother with history of kyphosis but no formal diagnosis of osteoporosis\par Treatment: Teriparatide 20 mcg SC daily from December 2020 to December 2022\par \par Falls: No\par Height loss: About 2 inches\par Kidney stones: No\par Dental health: Regular appointments, history of implants, no upcoming procedures planned\par Exercise: Physical therapy\par Dairy intake: 0-1 serving daily (glass of milk a few times a week)\par Calcium supplements: 600 mg daily\par Multivitamin: None\par Vitamin D supplements: 1000 intl units in calcium supplement\par \par Osteoporosis risk factors include: Postmenopausal status,  race, prior fracture, falls, height loss, small thin bones, tobacco use, excessive alcohol, anorexia, family history, vitamin D deficiency, corticosteroid use, seizure medications, malabsorption, hyperparathyroidism, hyperthyroidism.\par NEGATIVE EXCEPT: Prior fracture, tobacco use\par \par Interim History \par He completed a 24 month course of teriparatide in December 2022.\par He presents today for his first denosumab injection.\par Recent laboratory results demonstrated serum calcium, renal function, and vitamin D within the normal range.\par Medical and surgical history, medications, allergies, social and family history reviewed and updated as needed.

## 2023-03-02 NOTE — DATA REVIEWED
[FreeTextEntry1] : Most recent bone mineral density\par Date: November 24, 2020\par Source: Lunar\par Site: [facility] [facility]\par \par Site	BMD (g/cm2)	T-score	Change previous	Change baseline	\par Lumbar spine	0.611	-5.1\par Femoral neck	0.646	-3.3	\par Total hip	                0.735       -2.5         \par Distal radius           	0.579       -2.7          \par DXA Comments:

## 2023-03-02 NOTE — PHYSICAL EXAM
[Alert] : alert [Healthy Appearance] : healthy appearance [No Acute Distress] : no acute distress [Normal Sclera/Conjunctiva] : normal sclera/conjunctiva [Normal Hearing] : hearing was normal [No Respiratory Distress] : no respiratory distress [No Spinal Tenderness] : no spinal tenderness [Kyphosis] : kyphosis present [No Stigmata of Cushings Syndrome] : no stigmata of Cushings Syndrome [Normal Gait] : normal gait [Normal Insight/Judgement] : insight and judgment were intact [Scoliosis] : no scoliosis [Acanthosis Nigricans] : no acanthosis nigricans [de-identified] : no moon facies, no supraclavicular fat pads

## 2023-03-02 NOTE — ASSESSMENT
[FreeTextEntry1] : Osteoporosis. He has a history of fragility fracture. He received teriparatide therapy from December 2020 to December 2022. Metabolic evaluation for secondary causes of bone loss previously unremarkable. We discussed the need for antiresorptive therapy after a course of osteoanabolic therapy so he does not lose the bone that was gained with treatment. We discussed the potential benefits and risks of antiresorptive osteoporosis therapy at length, including but not limited to osteonecrosis of the jaw and atypical femoral fracture. He is amenable to therapy with denosumab. We discussed that denosumab must be dosed every 6 months due to rebound increase in bone breakdown with abrupt discontinuation of therapy, with transition to bisphosphonate therapy prior to a "drug holiday."\par Interval bone density testing \par Start denosumab 60 mg SC every 6 months; dose administered today\par Calcium 3109-3650 mg daily from diet and supplements (to be taken in divided doses as no more than 500-600 mg can be absorbed at one time); advised increased dietary and/or supplemental calcium\par Continue current vitamin D regimen\par Diet, exercise and fall prevention discussed\par \par I reviewed the DXA performed on February 7, 2022 with the patient today.\par I reviewed the laboratories performed on December 13, 2022 with the patient today. \par I counseled the patient regarding calcium and vitamin D intake today.\par I discussed the following osteoporosis therapies: Denosumab, teriparatide \par \par CC:\par Dr. Greg Bernard, Fax [phone]

## 2023-08-11 ENCOUNTER — NON-APPOINTMENT (OUTPATIENT)
Age: 83
End: 2023-08-11

## 2023-09-14 ENCOUNTER — APPOINTMENT (OUTPATIENT)
Dept: ENDOCRINOLOGY | Facility: CLINIC | Age: 83
End: 2023-09-14
Payer: MEDICARE

## 2023-09-14 VITALS
BODY MASS INDEX: 20.09 KG/M2 | DIASTOLIC BLOOD PRESSURE: 72 MMHG | WEIGHT: 128 LBS | HEART RATE: 72 BPM | HEIGHT: 67 IN | SYSTOLIC BLOOD PRESSURE: 114 MMHG

## 2023-09-14 DIAGNOSIS — S22.000A WEDGE COMPRESSION FRACTURE OF UNSPECIFIED THORACIC VERTEBRA, INITIAL ENCOUNTER FOR CLOSED FRACTURE: ICD-10-CM

## 2023-09-14 DIAGNOSIS — M81.0 AGE-RELATED OSTEOPOROSIS W/OUT CURRENT PATHOLOGICAL FRACTURE: ICD-10-CM

## 2023-09-14 DIAGNOSIS — E55.9 VITAMIN D DEFICIENCY, UNSPECIFIED: ICD-10-CM

## 2023-09-14 PROCEDURE — 96372 THER/PROPH/DIAG INJ SC/IM: CPT

## 2023-09-14 PROCEDURE — 99214 OFFICE O/P EST MOD 30 MIN: CPT | Mod: 25

## 2023-09-14 RX ORDER — DENOSUMAB 60 MG/ML
60 INJECTION SUBCUTANEOUS
Qty: 1 | Refills: 0 | Status: COMPLETED | OUTPATIENT
Start: 2023-09-14

## 2023-09-14 RX ORDER — CALCIUM CITRATE/VITAMIN D3 315MG-6.25
TABLET ORAL
Refills: 0 | Status: ACTIVE | COMMUNITY

## 2023-09-14 RX ADMIN — DENOSUMAB 60 MG/ML: 60 INJECTION SUBCUTANEOUS at 00:00

## 2023-10-10 ENCOUNTER — APPOINTMENT (OUTPATIENT)
Dept: OTOLARYNGOLOGY | Facility: CLINIC | Age: 83
End: 2023-10-10
Payer: MEDICARE

## 2023-10-10 VITALS — WEIGHT: 120 LBS | BODY MASS INDEX: 19.99 KG/M2 | HEIGHT: 65 IN

## 2023-10-10 PROCEDURE — 99213 OFFICE O/P EST LOW 20 MIN: CPT

## 2023-10-10 PROCEDURE — 92567 TYMPANOMETRY: CPT

## 2023-10-10 PROCEDURE — 92557 COMPREHENSIVE HEARING TEST: CPT

## 2023-10-10 RX ORDER — TAMSULOSIN HYDROCHLORIDE 0.4 MG/1
CAPSULE ORAL
Refills: 0 | Status: ACTIVE | COMMUNITY

## 2023-10-10 RX ORDER — PREDNISONE 50 MG/1
TABLET ORAL
Refills: 0 | Status: ACTIVE | COMMUNITY

## 2023-10-10 RX ORDER — PREDNISONE 10 MG/1
10 TABLET ORAL
Qty: 32 | Refills: 0 | Status: ACTIVE | COMMUNITY
Start: 2023-10-10 | End: 1900-01-01

## 2023-10-10 RX ORDER — DUTASTERIDE 0.5 MG/1
CAPSULE, LIQUID FILLED ORAL
Refills: 0 | Status: ACTIVE | COMMUNITY

## 2023-10-19 ENCOUNTER — APPOINTMENT (OUTPATIENT)
Dept: OTOLARYNGOLOGY | Facility: CLINIC | Age: 83
End: 2023-10-19
Payer: MEDICARE

## 2023-10-19 PROCEDURE — 99213 OFFICE O/P EST LOW 20 MIN: CPT | Mod: 25

## 2023-10-19 PROCEDURE — 69801 INCISE INNER EAR: CPT | Mod: LT

## 2023-10-19 PROCEDURE — 92557 COMPREHENSIVE HEARING TEST: CPT

## 2023-10-27 ENCOUNTER — APPOINTMENT (OUTPATIENT)
Dept: OTOLARYNGOLOGY | Facility: CLINIC | Age: 83
End: 2023-10-27
Payer: MEDICARE

## 2023-10-27 PROCEDURE — 99213 OFFICE O/P EST LOW 20 MIN: CPT | Mod: 25

## 2023-10-27 PROCEDURE — 92557 COMPREHENSIVE HEARING TEST: CPT

## 2023-10-27 PROCEDURE — 92567 TYMPANOMETRY: CPT

## 2023-10-27 PROCEDURE — 69801 INCISE INNER EAR: CPT | Mod: LT

## 2023-10-29 ENCOUNTER — OUTPATIENT (OUTPATIENT)
Dept: OUTPATIENT SERVICES | Facility: HOSPITAL | Age: 83
LOS: 1 days | End: 2023-10-29
Payer: MEDICARE

## 2023-10-29 ENCOUNTER — APPOINTMENT (OUTPATIENT)
Dept: MRI IMAGING | Facility: HOSPITAL | Age: 83
End: 2023-10-29

## 2023-10-29 PROCEDURE — A9585: CPT

## 2023-10-29 PROCEDURE — 70553 MRI BRAIN STEM W/O & W/DYE: CPT | Mod: 26

## 2023-10-29 PROCEDURE — 70553 MRI BRAIN STEM W/O & W/DYE: CPT

## 2023-11-06 ENCOUNTER — APPOINTMENT (OUTPATIENT)
Dept: OTOLARYNGOLOGY | Facility: CLINIC | Age: 83
End: 2023-11-06
Payer: MEDICARE

## 2023-11-06 PROCEDURE — 92567 TYMPANOMETRY: CPT

## 2023-11-06 PROCEDURE — 69801 INCISE INNER EAR: CPT

## 2023-11-06 PROCEDURE — 92557 COMPREHENSIVE HEARING TEST: CPT

## 2023-11-10 NOTE — ASSESSMENT
[FreeTextEntry1] : Osteoporosis. He has a history of fragility fracture. He has no history of prior osteoporosis therapy. We discussed completion of a metabolic evaluation for secondary causes of bone loss. Given comorbidity of benign prostatic hypertrophy, defer measurement of testosterone since will not affect therapy. We discussed the potential benefits and risks of the osteoanabolic and antiresorptive classes of pharmacologic osteoporosis therapy. Since he is considered at very high risk for fracture, we focused on the osteoanabolic class. We discussed the risks and benefits of osteoanabolic therapy, including but not limited to hypercalcemia, nausea, orthostatic hypotension, osteosarcoma. He may be amenable to teriparatide pending further evaluation and prior authorization from insurance. We would check serum calcium one month after therapy initiation. We discussed the need for antiresorptive therapy after a course of osteoanabolic therapy so he does not lose the bone that was gained with treatment. If we do not proceed with teriparatide, denosumab would be a reasonable alternative. \par Metabolic evaluation for secondary causes of osteoporosis\par Calcium 1200 mg daily from diet and supplements (to be taken in divided doses as no more than 500-600 mg can be absorbed at one time); advised supplemental calcium\par Continue current vitamin D regimen pending level\par Diet, exercise and fall prevention discussed\par Physical therapy for balance and bone health\par \par I reviewed the DXA performed on November 24, 2020 with the patient today.\par I reviewed the laboratories performed on June 24, 2020 with the patient today. \par I counseled the patient regarding calcium and vitamin D intake today.\par I discussed the following osteoporosis therapies: Alendronate, risedronate, ibandronate, zoledronic acid, denosumab, teriparatide \par \par CC:\par Dr. Greg Bernard, Fax [phone] Attending Attestation (For Attendings USE Only)...

## 2023-11-17 ENCOUNTER — APPOINTMENT (OUTPATIENT)
Dept: OTOLARYNGOLOGY | Facility: CLINIC | Age: 83
End: 2023-11-17
Payer: MEDICARE

## 2023-11-17 VITALS — WEIGHT: 120 LBS | HEIGHT: 65 IN | BODY MASS INDEX: 19.99 KG/M2

## 2023-11-17 DIAGNOSIS — H93.299 OTHER ABNORMAL AUDITORY PERCEPTIONS, UNSPECIFIED EAR: ICD-10-CM

## 2023-11-17 DIAGNOSIS — Z87.891 PERSONAL HISTORY OF NICOTINE DEPENDENCE: ICD-10-CM

## 2023-11-17 PROCEDURE — 69801 INCISE INNER EAR: CPT | Mod: LT

## 2023-11-17 PROCEDURE — 31575 DIAGNOSTIC LARYNGOSCOPY: CPT

## 2023-11-17 PROCEDURE — 92557 COMPREHENSIVE HEARING TEST: CPT

## 2023-11-17 PROCEDURE — 99213 OFFICE O/P EST LOW 20 MIN: CPT | Mod: 25

## 2023-11-17 PROCEDURE — 92550 TYMPANOMETRY & REFLEX THRESH: CPT | Mod: 52

## 2023-11-20 PROBLEM — H93.299 ABNORMAL AUDITORY PERCEPTION: Status: ACTIVE | Noted: 2023-11-20

## 2023-12-12 ENCOUNTER — APPOINTMENT (OUTPATIENT)
Dept: OTOLARYNGOLOGY | Facility: CLINIC | Age: 83
End: 2023-12-12
Payer: MEDICARE

## 2023-12-12 PROCEDURE — 99214 OFFICE O/P EST MOD 30 MIN: CPT

## 2023-12-12 PROCEDURE — 92557 COMPREHENSIVE HEARING TEST: CPT

## 2023-12-12 RX ORDER — IPRATROPIUM BROMIDE 21 UG/1
0.03 SPRAY NASAL
Qty: 1 | Refills: 3 | Status: ACTIVE | COMMUNITY
Start: 2023-12-12 | End: 1900-01-01

## 2024-03-14 ENCOUNTER — APPOINTMENT (OUTPATIENT)
Dept: ENDOCRINOLOGY | Facility: CLINIC | Age: 84
End: 2024-03-14
Payer: MEDICARE

## 2024-03-14 PROCEDURE — 96372 THER/PROPH/DIAG INJ SC/IM: CPT

## 2024-03-15 ENCOUNTER — APPOINTMENT (OUTPATIENT)
Dept: OTOLARYNGOLOGY | Facility: CLINIC | Age: 84
End: 2024-03-15
Payer: MEDICARE

## 2024-03-15 DIAGNOSIS — J34.2 DEVIATED NASAL SEPTUM: ICD-10-CM

## 2024-03-15 DIAGNOSIS — H91.22 SUDDEN IDIOPATHIC HEARING LOSS, LEFT EAR: ICD-10-CM

## 2024-03-15 DIAGNOSIS — J31.0 CHRONIC RHINITIS: ICD-10-CM

## 2024-03-15 DIAGNOSIS — H90.3 SENSORINEURAL HEARING LOSS, BILATERAL: ICD-10-CM

## 2024-03-15 DIAGNOSIS — K21.9 GASTRO-ESOPHAGEAL REFLUX DISEASE W/OUT ESOPHAGITIS: ICD-10-CM

## 2024-03-15 DIAGNOSIS — R09.82 POSTNASAL DRIP: ICD-10-CM

## 2024-03-15 PROCEDURE — 92557 COMPREHENSIVE HEARING TEST: CPT

## 2024-03-15 PROCEDURE — 92504 EAR MICROSCOPY EXAMINATION: CPT

## 2024-03-15 PROCEDURE — 92567 TYMPANOMETRY: CPT

## 2024-03-15 PROCEDURE — 99213 OFFICE O/P EST LOW 20 MIN: CPT | Mod: 25

## 2024-03-15 NOTE — ASSESSMENT
[FreeTextEntry1] : He has a bilateral sensorineural hearing loss with asymmetry in the left ear.  He had a sudden hearing loss in October.  His hearing remains unchanged.  There was a crust on the left tympanic membrane which was removed.  He has a history of chronic rhinitis with nasal obstruction and postnasal drip.  Plan -Findings and management options were discussed with the patient and his daughter - Continue good ear hygiene - Monitor hearing - Hearing aid evaluation recommended - They may use a nasal steroid spray or the Atrovent nasal spray as needed for nasal obstruction. - They could try Breathe Right strips or nasal cones - Follow-up in 6 months if doing well - Return earlier if any problems

## 2024-03-15 NOTE — HISTORY OF PRESENT ILLNESS
[de-identified] : SHIKHA MCCORMACK is a 83 year old patient Here for follow-up.  He has a bilateral sensorineural hearing loss and suffered a left sudden sensorineural hearing loss on October 6.  His hearing remains unchanged.  He has no otalgia or otorrhea.  He was again accompanied by his daughter who assisted with translation.  He is not using hearing aids.  He still has a postnasal drip and nasal obstruction.   ENT history He had a sudden left sensorineural hearing loss which occurred on October 6.  He was treated with oral steroids and for intratympanic steroid injections. MRI was negative for IAC masses No history of recurrent ear infections, prior otologic surgery, ear trauma, or excessive noise exposure He does not use hearing aids He has a history of mucus in his throat. He has reflux and a history of smoking in the past. Nasal endoscopy showed a deviated septum

## 2024-03-15 NOTE — PHYSICAL EXAM
[TextEntry] : PHYSICAL EXAM  General: The patient was alert, oriented and in no distress. Voice was clear.  Face: The patient had no facial asymmetry or mass. The skin was unremarkable.  Ears: External ears were normal without deformity. Ear canals: Right ear-no cerumen or inflammation Tympanic membranes right ear-intact and normal. No perforation or effusion. mobile  Ear canals- see microscopy  Procedure: Microscopic Ear Exam The patient was positioned comfortably.  The microscope was used.  Left ear:  Ear canal-no cerumen or inflammation Tympanic membrane-there was a small crust on the tympanic membrane which was removed with a pick and a forceps.  Intact with no inflammation, perforation or effusion  Nose:  The external nose had no significant deformity.   . On anterior rhinoscopy, the nasal mucosa was clear.  The anterior septum was deviated. There were no visualized polyps, purulence  or masses.   Oral cavity: Oral mucosa- normal. Oral and base of tongue- clear and without mass. Gingival and buccal mucosa- moist and without lesions. Palate- the palate moved well. There was no cleft palate. There appeared to be good salivary flow.   Oral cavity/oropharynx- no pus, erythema or mass  Neck:  The neck was symmetrical. The parotid and submandibular glands were normal without masses. The trachea was midline and there was no unusual crepitus. Thyroid was smooth and nontender and no masses were palpated. No masses  Lymphatics: Cervical adenopathy- none.    AUDIOGRAM- test ordered and the results reviewed and discussed with the patient and his daughter.  It was compared to his prior audiogram Hearing-bilateral sensorineural hearing loss with asymmetry in the left ear-no change Word recognition-right ear-92%.  Left ear-40% Tympanograms- AD- type A, AS- type A

## 2024-03-20 PROBLEM — H90.3 ASYMMETRIC SNHL (SENSORINEURAL HEARING LOSS): Status: ACTIVE | Noted: 2023-10-10

## 2024-03-20 PROBLEM — H90.3 SENSORINEURAL HEARING LOSS (SNHL) OF BOTH EARS: Status: ACTIVE | Noted: 2023-10-16

## 2024-09-10 ENCOUNTER — APPOINTMENT (OUTPATIENT)
Dept: ENDOCRINOLOGY | Facility: CLINIC | Age: 84
End: 2024-09-10
Payer: MEDICARE

## 2024-09-10 PROCEDURE — 96372 THER/PROPH/DIAG INJ SC/IM: CPT

## 2024-09-11 LAB
25(OH)D3 SERPL-MCNC: 49.5 NG/ML
ALBUMIN SERPL ELPH-MCNC: 4.2 G/DL
ALP BLD-CCNC: 53 U/L
ALT SERPL-CCNC: 8 U/L
ANION GAP SERPL CALC-SCNC: 12 MMOL/L
AST SERPL-CCNC: 19 U/L
BILIRUB SERPL-MCNC: 0.3 MG/DL
BUN SERPL-MCNC: 23 MG/DL
CALCIUM SERPL-MCNC: 9.6 MG/DL
CALCIUM SERPL-MCNC: 9.6 MG/DL
CHLORIDE SERPL-SCNC: 103 MMOL/L
CO2 SERPL-SCNC: 23 MMOL/L
CREAT SERPL-MCNC: 1.14 MG/DL
EGFR: 64 ML/MIN/1.73M2
GLUCOSE SERPL-MCNC: 126 MG/DL
MAGNESIUM SERPL-MCNC: 1.9 MG/DL
PARATHYROID HORMONE INTACT: 21 PG/ML
PHOSPHATE SERPL-MCNC: 2.8 MG/DL
POTASSIUM SERPL-SCNC: 3.9 MMOL/L
PROT SERPL-MCNC: 6.3 G/DL
SODIUM SERPL-SCNC: 138 MMOL/L

## 2024-09-13 ENCOUNTER — APPOINTMENT (OUTPATIENT)
Dept: OTOLARYNGOLOGY | Facility: CLINIC | Age: 84
End: 2024-09-13

## 2024-09-13 LAB — ALP BONE SERPL-MCNC: 5.4 UG/L

## 2024-09-23 ENCOUNTER — APPOINTMENT (OUTPATIENT)
Dept: OTOLARYNGOLOGY | Facility: CLINIC | Age: 84
End: 2024-09-23
Payer: MEDICARE

## 2024-09-23 DIAGNOSIS — H90.3 SENSORINEURAL HEARING LOSS, BILATERAL: ICD-10-CM

## 2024-09-23 DIAGNOSIS — J31.0 CHRONIC RHINITIS: ICD-10-CM

## 2024-09-23 DIAGNOSIS — J34.2 DEVIATED NASAL SEPTUM: ICD-10-CM

## 2024-09-23 DIAGNOSIS — R09.82 POSTNASAL DRIP: ICD-10-CM

## 2024-09-23 DIAGNOSIS — K21.9 GASTRO-ESOPHAGEAL REFLUX DISEASE W/OUT ESOPHAGITIS: ICD-10-CM

## 2024-09-23 PROCEDURE — 92557 COMPREHENSIVE HEARING TEST: CPT

## 2024-09-23 PROCEDURE — 92567 TYMPANOMETRY: CPT

## 2024-09-23 PROCEDURE — 99213 OFFICE O/P EST LOW 20 MIN: CPT | Mod: 25

## 2024-09-23 PROCEDURE — 31231 NASAL ENDOSCOPY DX: CPT

## 2024-09-23 NOTE — HISTORY OF PRESENT ILLNESS
[de-identified] : SHIKHA MCCORMACK is a 84 year old patient here for follow-up for a left sudden sensorineural hearing loss which occurred on October 6.  He was accompanied by his daughter.  His hearing has remained unchanged.  He is not using hearing aids. He had been cleaning his ears with either a cotton swab or an instrument.  He and his wife think there may be a bump in the ear.  He has no otalgia or otorrhea.  He has not had bleeding  He also continues to suffer from mild intermittent dysphonia, nasal congestion, and postnasal drip.  He does have a history of reflux.  He had a history of smoking in the past.  It is unclear if he is still smoking   ENT history He had a sudden left sensorineural hearing loss which occurred on October 6. He was treated with oral steroids and for intratympanic steroid injections. MRI was negative for IAC masses No history of recurrent ear infections, prior otologic surgery, ear trauma, or excessive noise exposure He does not use hearing aids He has a history of mucus in his throat. He has reflux and a history of smoking in the past. Nasal endoscopy showed a deviated septum

## 2024-09-23 NOTE — PHYSICAL EXAM
[TextEntry] : PHYSICAL EXAM  General: The patient was alert, oriented and in no distress. Voice was clear. He was mildly congested  Face: The patient had no facial asymmetry or mass. The skin was unremarkable.  Ears: External ears were normal without deformity. Ear canals- see microscopy  Procedure: Microscopic Ear Exam The patient was positioned comfortably.  The microscope was used.  Left ear:  Ear canal-there was a small piece of dry hard wax on the lateral inferior canal which was removed atraumatically with a forceps.  He had a bruise on the ear canal likely due to the instrument he tried to clean the ear with.  There was dry skin but no inflammation Tympanic membrane- intact with no inflammation, perforation or effusion  Right ear: Ear canal-scant cerumen was removed atraumatically with a forceps.  Dry skin but no inflammation Tympanic membrane- intact with no inflammation, perforation or effusion   Nose:  The external nose had no significant deformity.  [There was no facial tenderness]. On anterior rhinoscopy, the nasal mucosa was [clear].  The anterior septum was [grossly midline]. There were no visualized polyps, purulence  or masses.   Oral cavity: Oral mucosa- normal. Oral and base of tongue- clear and without mass. Gingival and buccal mucosa- moist and without lesions. Palate- the palate moved well. There was no cleft palate. There appeared to be good salivary flow.   Oral cavity/oropharynx- no pus, erythema or mass  Neck:  The neck was symmetrical. The parotid and submandibular glands were normal without masses. The trachea was midline and there was no unusual crepitus. Thyroid was smooth and nontender and no masses were palpated. No masses  Lymphatics: Cervical adenopathy- none.    PROCEDURE:  FLEXIBLE LARYNGOSCOPY, NASAL ENDOSCOPY   Surgeon: Dr. Marin Indication: Chronic rhinitis, postnasal drip, deviated septum, dysphonia, inadequate exam on anterior rhinoscopy Anesthetic: Topical lidocaine and Afrin Procedure: The patient was placed in a sitting position.  Following application of the topical anesthetic and decongestant, exam was performed with a flexible telescope.  The scope was passed along the right nasal floor to the nasopharynx.  It was then passed into the region of the middle meatus, middle turbinate, and sphenoethmoid region.  An identical procedure was performed on the left side.  The following findings were noted:  Nasal mucosa: Mild edema Septum: Severely deviated to the left  Right nasal cavity      Inferior turbinate: Hypertrophic      Middle turbinate: normal      Superior turbinate: normal      Inferior meatus: no pus, polyps or congestion      Middle meatus:  no pus, polyps or congestion       Superior meatus:  no pus, polyps, or congestion      Sphenoethmoidal recess: no pus, polyps or congestion   Left nasal cavity      Inferior turbinate: Hypertrophic      Middle turbinate: normal      Superior turbinate: normal      Inferior meatus: no pus, polyps or congestion      Middle meatus: no pus, polyps, or congestion      Superior meatus:  no pus, polyps, or congestion      Sphenoethmoidal recess: no pus, polyps or congestion   Nasopharynx: no masses, choanae patent, no adenoid tissue  Base of tongue and vallecula: no masses or asymmetry Posterior pharyngeal wall: no masses.  Hypopharynx: symmetrical. No masses Pyriform sinuses: no lesions or pooling of secretions Epiglottis: normal. No edema or lesions Aryepiglottic folds: normal. No lesions.  True vocal cords: clear and mobile. No lesions. Airway patent.  Mild vocal cord bowing with small gap on phonation False vocal cords: normal Ventricles: no masses.  Arytenoids: Normal Interarytenoid area: no masses.  Mild edema Subglottis: normal. no masses

## 2024-09-23 NOTE — HISTORY OF PRESENT ILLNESS
[de-identified] : SHIKHA MCCORMACK is a 84 year old patient here for follow-up for a left sudden sensorineural hearing loss which occurred on October 6.  He was accompanied by his daughter.  His hearing has remained unchanged.  He is not using hearing aids. He had been cleaning his ears with either a cotton swab or an instrument.  He and his wife think there may be a bump in the ear.  He has no otalgia or otorrhea.  He has not had bleeding  He also continues to suffer from mild intermittent dysphonia, nasal congestion, and postnasal drip.  He does have a history of reflux.  He had a history of smoking in the past.  It is unclear if he is still smoking   ENT history He had a sudden left sensorineural hearing loss which occurred on October 6. He was treated with oral steroids and for intratympanic steroid injections. MRI was negative for IAC masses No history of recurrent ear infections, prior otologic surgery, ear trauma, or excessive noise exposure He does not use hearing aids He has a history of mucus in his throat. He has reflux and a history of smoking in the past. Nasal endoscopy showed a deviated septum

## 2024-09-23 NOTE — ASSESSMENT
[FreeTextEntry1] : There were no suspicious masses or lesions in his left ear.  He did have a small hard piece of wax laterally in the Gaile canal.  This may have been the bump that they were seeing.  He also had a bruise on the canal.  There was no acute infection.    He has bilateral sensorineural hearing loss with asymmetry in the left ear following a sudden hearing loss last year.  Repeat audiogram was reviewed.  The hearing was stable.  Both ear showed poorer word recognition.  He has a history of dysphonia, postnasal drip, and nasal congestion.  On exam, he has a deviated septum, mild nasal mucosal edema, laryngeal changes consistent with reflux, and presbylarynx  Plan -Findings and management options were discussed with the patient and his daughter.  She was able to assist with translation - Good ear hygiene reviewed.  They should avoid using objects to clean the ears - Wax removal drops as needed - Monitor hearing. we will monitor his word recognition.  he measured worse in both ears today - Hearing aid evaluation again recommended. he may benefit from [employer] hearing aids - Nasal steroid spray or Atrovent spray as needed for nasal congestion and drainage - Reflux precautions recommended.  He was again given literature regarding postnasal drip and reflux.  Reflux medication as needed - Avoid smoking - He could consider speech therapy evaluation if the dysphonia bothers him.  He could also consider evaluation for vocal cord augmentation - Follow-up in 1 year if doing well - Call and return earlier if any problems

## 2024-09-23 NOTE — REASON FOR VISIT
[Subsequent Evaluation] : a subsequent evaluation for [Hearing Loss] : hearing loss [FreeTextEntry2] : dysphonia

## 2025-03-24 ENCOUNTER — OUTPATIENT (OUTPATIENT)
Dept: OUTPATIENT SERVICES | Facility: HOSPITAL | Age: 85
LOS: 1 days | End: 2025-03-24
Payer: MEDICARE

## 2025-03-24 ENCOUNTER — APPOINTMENT (OUTPATIENT)
Dept: INFUSION THERAPY | Facility: CLINIC | Age: 85
End: 2025-03-24

## 2025-03-24 VITALS
OXYGEN SATURATION: 99 % | RESPIRATION RATE: 18 BRPM | SYSTOLIC BLOOD PRESSURE: 112 MMHG | TEMPERATURE: 98 F | DIASTOLIC BLOOD PRESSURE: 77 MMHG | HEART RATE: 78 BPM

## 2025-03-24 DIAGNOSIS — M81.0 AGE-RELATED OSTEOPOROSIS WITHOUT CURRENT PATHOLOGICAL FRACTURE: ICD-10-CM

## 2025-03-24 PROCEDURE — 96372 THER/PROPH/DIAG INJ SC/IM: CPT

## 2025-03-24 RX ORDER — DENOSUMAB 60 MG/ML
60 INJECTION SUBCUTANEOUS ONCE
Refills: 0 | Status: COMPLETED | OUTPATIENT
Start: 2025-03-24 | End: 2025-03-24

## 2025-03-24 RX ADMIN — DENOSUMAB 60 MILLIGRAM(S): 60 INJECTION SUBCUTANEOUS at 18:00

## 2025-04-16 ENCOUNTER — TRANSCRIPTION ENCOUNTER (OUTPATIENT)
Age: 85
End: 2025-04-16

## 2025-04-18 ENCOUNTER — TRANSCRIPTION ENCOUNTER (OUTPATIENT)
Age: 85
End: 2025-04-18

## 2025-08-22 ENCOUNTER — LABORATORY RESULT (OUTPATIENT)
Age: 85
End: 2025-08-22

## 2025-08-23 ENCOUNTER — NON-APPOINTMENT (OUTPATIENT)
Age: 85
End: 2025-08-23

## 2025-08-25 ENCOUNTER — NON-APPOINTMENT (OUTPATIENT)
Age: 85
End: 2025-08-25

## 2025-08-26 ENCOUNTER — NON-APPOINTMENT (OUTPATIENT)
Age: 85
End: 2025-08-26

## 2025-09-02 ENCOUNTER — NON-APPOINTMENT (OUTPATIENT)
Age: 85
End: 2025-09-02

## 2025-09-09 ENCOUNTER — NON-APPOINTMENT (OUTPATIENT)
Age: 85
End: 2025-09-09

## 2025-09-09 ENCOUNTER — APPOINTMENT (OUTPATIENT)
Dept: ENDOCRINOLOGY | Facility: CLINIC | Age: 85
End: 2025-09-09
Payer: MEDICARE

## 2025-09-09 VITALS
DIASTOLIC BLOOD PRESSURE: 70 MMHG | SYSTOLIC BLOOD PRESSURE: 111 MMHG | HEIGHT: 65 IN | WEIGHT: 127 LBS | BODY MASS INDEX: 21.16 KG/M2 | HEART RATE: 67 BPM

## 2025-09-09 DIAGNOSIS — S22.000A WEDGE COMPRESSION FRACTURE OF UNSPECIFIED THORACIC VERTEBRA, INITIAL ENCOUNTER FOR CLOSED FRACTURE: ICD-10-CM

## 2025-09-09 DIAGNOSIS — E55.9 VITAMIN D DEFICIENCY, UNSPECIFIED: ICD-10-CM

## 2025-09-09 DIAGNOSIS — M81.0 AGE-RELATED OSTEOPOROSIS W/OUT CURRENT PATHOLOGICAL FRACTURE: ICD-10-CM

## 2025-09-09 PROCEDURE — 99214 OFFICE O/P EST MOD 30 MIN: CPT

## 2025-09-09 PROCEDURE — G2211 COMPLEX E/M VISIT ADD ON: CPT

## 2025-09-12 ENCOUNTER — APPOINTMENT (OUTPATIENT)
Dept: INFUSION THERAPY | Facility: CLINIC | Age: 85
End: 2025-09-12